# Patient Record
Sex: MALE | Race: WHITE
[De-identification: names, ages, dates, MRNs, and addresses within clinical notes are randomized per-mention and may not be internally consistent; named-entity substitution may affect disease eponyms.]

---

## 2021-05-12 ENCOUNTER — HOSPITAL ENCOUNTER (OUTPATIENT)
Dept: HOSPITAL 63 - SURG | Age: 51
Discharge: HOME | End: 2021-05-12
Payer: MEDICARE

## 2021-05-12 VITALS — SYSTOLIC BLOOD PRESSURE: 159 MMHG | DIASTOLIC BLOOD PRESSURE: 112 MMHG

## 2021-05-12 DIAGNOSIS — Z79.899: ICD-10-CM

## 2021-05-12 DIAGNOSIS — Z72.89: ICD-10-CM

## 2021-05-12 DIAGNOSIS — K64.8: Primary | ICD-10-CM

## 2021-05-12 DIAGNOSIS — Z20.822: ICD-10-CM

## 2021-05-12 DIAGNOSIS — K64.4: ICD-10-CM

## 2021-05-12 PROCEDURE — 87426 SARSCOV CORONAVIRUS AG IA: CPT

## 2021-05-12 PROCEDURE — U0003 INFECTIOUS AGENT DETECTION BY NUCLEIC ACID (DNA OR RNA); SEVERE ACUTE RESPIRATORY SYNDROME CORONAVIRUS 2 (SARS-COV-2) (CORONAVIRUS DISEASE [COVID-19]), AMPLIFIED PROBE TECHNIQUE, MAKING USE OF HIGH THROUGHPUT TECHNOLOGIES AS DESCRIBED BY CMS-2020-01-R: HCPCS

## 2021-05-12 PROCEDURE — C9803 HOPD COVID-19 SPEC COLLECT: HCPCS

## 2021-05-12 PROCEDURE — 46255 REMOVE INT/EXT HEM 1 GROUP: CPT

## 2021-05-12 NOTE — DISCH
DISCHARGE INSTRUCTIONS-DC


Condition on Discharge


Condition on Discharge:  Stable





Activity after Discharge


Activity Instructions for Disc:  Avoid exertion





Diet after Discharge


Diet after Discharge:  Regular





Wound/Incision Care


Other wound/incision instructi:  May shower 24 hours





Contacting the [facility] after DC


Call your doctor for:  If your condition worsens





Follow-Up


Follow up with:  Alejandro in 2 weeks











PHYLLIS DIAZ MD             May 12, 2021 10:14

## 2021-05-12 NOTE — PDOC4
Operative Report


DATE


May 12 of 2021 at 1010


Preop Diagnosis


Internal and external hemorrhoids


Post-op Diagnosis


Same


Operation Performed


Hemorrhoidectomy


Patient is 50-year-old male with grade 4 prolapsed internal hemorrhoids with 

bleeding.  Procedure of hemorrhoidectomy was explained to the patient detail 

risk benefits were also discussed including bleeding infection alternatives to 

this procedure also discussed with the patient who seemed to understand and gave

both verbal and written consent to have the procedure performed.  Patient was 

taken to the operating room placed in supine position general anesthesia was 

initiated once patient was sleeping intubated was placed in the lithotomy 

positioning and his perineum was prepped and draped usual sterile fashion was 

Betadine scrub and solution.  Area around the anus with the hemorrhoids was 

injected with quarter percent Marcaine with epinephrine hemorrhoids were grasped

with a Allis clamp and using the harmonic scalpel were excised 3 large piles 

were excised.  The area was then dressed with antibiotic ointment ABD and 

briefs.  Patient was placed back in the supine positioning awakened and 

extubated in the operating room taken to recovery in stable condition all sponge

instrument needle counts listed as correct estimated blood loss 20 mL.


Surgeon


Alejandro


ANESTHESIA PROPOSED:  GENERAL, LOCAL


Blood Loss


10 mL


Specimen


Hemorrhoids


Complications


None











PHYLLIS DIAZ MD             May 12, 2021 10:13